# Patient Record
Sex: FEMALE | Race: ASIAN
[De-identification: names, ages, dates, MRNs, and addresses within clinical notes are randomized per-mention and may not be internally consistent; named-entity substitution may affect disease eponyms.]

---

## 2019-09-12 ENCOUNTER — HOSPITAL ENCOUNTER (OUTPATIENT)
Dept: HOSPITAL 4 - SED | Age: 54
Setting detail: OBSERVATION
LOS: 1 days | Discharge: HOME | End: 2019-09-13
Attending: INTERNAL MEDICINE | Admitting: INTERNAL MEDICINE
Payer: COMMERCIAL

## 2019-09-12 VITALS — HEIGHT: 63 IN | WEIGHT: 103 LBS | BODY MASS INDEX: 18.25 KG/M2

## 2019-09-12 VITALS — SYSTOLIC BLOOD PRESSURE: 120 MMHG

## 2019-09-12 VITALS — SYSTOLIC BLOOD PRESSURE: 113 MMHG

## 2019-09-12 VITALS — SYSTOLIC BLOOD PRESSURE: 141 MMHG

## 2019-09-12 VITALS — SYSTOLIC BLOOD PRESSURE: 104 MMHG

## 2019-09-12 VITALS — SYSTOLIC BLOOD PRESSURE: 108 MMHG

## 2019-09-12 DIAGNOSIS — R11.2: ICD-10-CM

## 2019-09-12 DIAGNOSIS — E87.1: ICD-10-CM

## 2019-09-12 DIAGNOSIS — R10.13: Primary | ICD-10-CM

## 2019-09-12 LAB
ALBUMIN SERPL BCP-MCNC: 3.9 G/DL (ref 3.4–4.8)
ALT SERPL W P-5'-P-CCNC: 24 U/L (ref 12–78)
ANION GAP SERPL CALCULATED.3IONS-SCNC: 15 MMOL/L (ref 5–15)
APPEARANCE UR: CLEAR
AST SERPL W P-5'-P-CCNC: 23 U/L (ref 10–37)
BACTERIA URNS QL MICRO: (no result) /HPF
BASOPHILS # BLD AUTO: 0 K/UL (ref 0–0.2)
BASOPHILS NFR BLD AUTO: 0.4 % (ref 0–2)
BILIRUB SERPL-MCNC: 1 MG/DL (ref 0–1)
BILIRUB UR QL STRIP: NEGATIVE
BUN SERPL-MCNC: 4 MG/DL (ref 8–21)
CALCIUM SERPL-MCNC: 8.3 MG/DL (ref 8.4–11)
CHLORIDE SERPL-SCNC: 87 MMOL/L (ref 98–107)
COLOR UR: YELLOW
CREAT SERPL-MCNC: 0.81 MG/DL (ref 0.55–1.3)
EOSINOPHIL # BLD AUTO: 0 K/UL (ref 0–0.4)
EOSINOPHIL NFR BLD AUTO: 0 % (ref 0–4)
ERYTHROCYTE [DISTWIDTH] IN BLOOD BY AUTOMATED COUNT: 12.2 % (ref 9–15)
GLUCOSE SERPL-MCNC: 139 MG/DL (ref 70–99)
GLUCOSE UR STRIP-MCNC: (no result) MG/DL
HCT VFR BLD AUTO: 39.4 % (ref 36–48)
HGB BLD-MCNC: 13.4 G/DL (ref 12–16)
HGB UR QL STRIP: (no result)
KETONES UR STRIP-MCNC: (no result) MG/DL
LEUKOCYTE ESTERASE UR QL STRIP: NEGATIVE
LIPASE SERPL-CCNC: 74 U/L (ref 73–393)
LYMPHOCYTES # BLD AUTO: 0.7 K/UL (ref 1–5.5)
LYMPHOCYTES NFR BLD AUTO: 11.7 % (ref 20.5–51.5)
MCH RBC QN AUTO: 31 PG (ref 27–31)
MCHC RBC AUTO-ENTMCNC: 34 % (ref 32–36)
MCV RBC AUTO: 90 FL (ref 79–98)
MONOCYTES # BLD MANUAL: 0.2 K/UL (ref 0–1)
MONOCYTES # BLD MANUAL: 2.5 % (ref 1.7–9.3)
NEUTROPHILS # BLD AUTO: 5.3 K/UL (ref 1.8–7.7)
NEUTROPHILS NFR BLD AUTO: 85.4 % (ref 40–70)
NITRITE UR QL STRIP: NEGATIVE
PH UR STRIP: 6.5 [PH] (ref 5–8)
PLATELET # BLD AUTO: 195 K/UL (ref 130–430)
POTASSIUM SERPL-SCNC: 3.3 MMOL/L (ref 3.5–5.1)
PROT UR QL STRIP: NEGATIVE
RBC # BLD AUTO: 4.37 MIL/UL (ref 4.2–6.2)
RBC #/AREA URNS HPF: (no result) /HPF (ref 0–3)
SODIUM SERPLBLD-SCNC: 120 MMOL/L (ref 136–145)
SP GR UR STRIP: 1.01 (ref 1–1.03)
UROBILINOGEN UR STRIP-MCNC: 0.2 MG/DL (ref 0.2–1)
WBC # BLD AUTO: 6.2 K/UL (ref 4.8–10.8)
WBC #/AREA URNS HPF: (no result) /HPF (ref 0–3)

## 2019-09-12 PROCEDURE — 83690 ASSAY OF LIPASE: CPT

## 2019-09-12 PROCEDURE — 80048 BASIC METABOLIC PNL TOTAL CA: CPT

## 2019-09-12 PROCEDURE — 96361 HYDRATE IV INFUSION ADD-ON: CPT

## 2019-09-12 PROCEDURE — 99285 EMERGENCY DEPT VISIT HI MDM: CPT

## 2019-09-12 PROCEDURE — 96374 THER/PROPH/DIAG INJ IV PUSH: CPT

## 2019-09-12 PROCEDURE — 36415 COLL VENOUS BLD VENIPUNCTURE: CPT

## 2019-09-12 PROCEDURE — 85025 COMPLETE CBC W/AUTO DIFF WBC: CPT

## 2019-09-12 PROCEDURE — G0378 HOSPITAL OBSERVATION PER HR: HCPCS

## 2019-09-12 PROCEDURE — 80053 COMPREHEN METABOLIC PANEL: CPT

## 2019-09-12 PROCEDURE — 96375 TX/PRO/DX INJ NEW DRUG ADDON: CPT

## 2019-09-12 PROCEDURE — 81000 URINALYSIS NONAUTO W/SCOPE: CPT

## 2019-09-12 PROCEDURE — C9113 INJ PANTOPRAZOLE SODIUM, VIA: HCPCS

## 2019-09-12 RX ADMIN — SODIUM CHLORIDE SCH MLS/HR: 9 INJECTION, SOLUTION INTRAVENOUS at 10:31

## 2019-09-12 RX ADMIN — SODIUM CHLORIDE SCH MLS/HR: 9 INJECTION, SOLUTION INTRAVENOUS at 20:38

## 2019-09-12 NOTE — NUR
Patient will be admitted to care of Dr. Skinner.  Admitted to med surg unit.  
Will go to room 100B.  Belongings list completed.  Summary report printed. 
Report will be given at bedside.

## 2019-09-12 NOTE — NUR
ADMISSION NOTE

Received patient from ER via edwar, received report from La Nena PRESTON. Patient admitted with 
diagnosis of Abdominal pain secondary to Colonoscopy preparation. Patient oriented to 
hospital routine, call light, toileting and safety-patient verbalized understanding.

## 2019-09-12 NOTE — NUR
Pt is AAOx4 and able to verbalize needs. Pt bib BLS transport c/o n/v since 
2030 last night. Per pt she is scheduled to have a colonoscopy today at 
[place]. Pt states that she was taking bowel prep in preparation for 
colonoscopy. After the second dose she started to feel nauseous and could not 
stop vomitting since 2030 last night. Pt states nausea at this time. Denies any 
pain. Will continue to monitor pt.

## 2019-09-12 NOTE — NUR
Medication reconciliation completed with information provided by Patient. No 
home meds per patient.

## 2019-09-12 NOTE — NUR
Sleeping

No signs of distress noted. Breathing even and unlabored. IVF infusing well. Call light with 
the patient. Safety precautions in place.

## 2019-09-12 NOTE — NUR
patient seen and completed admission assessment but patient speaks mandarin but understood 
some english. has iv access on the left ac #20. saline lock. breathing even and unlabored. 
abdomen soft and non distended. ambulatory goes to the bathroom by herself. bed in low 
position, alarmed and locked. call lights within reach. instructed to call for assistance.

## 2019-09-12 NOTE — NUR
Resting

Patient is resting comfortably in bed, talking to family. No signs of distress noted. 
Breathing is even and unlabored. IVF infusing well. Updated patient plan of care. Patient 
verbalized understanding. Call light with the patient. Safety precautions in place. 

-------------------------------------------------------------------------------

Addendum: 09/12/19 at 2154 by Nicole Bob RN

-------------------------------------------------------------------------------

Patient denies nausea and pain at this time.

## 2019-09-12 NOTE — NUR
End of life care decisions discussed with  Patient. Opportunity for questions 
and concerns addressed. Patient's code status is full code paperwork completed 
and placed in chart.

## 2019-09-12 NOTE — NUR
Opening notes

Received report. Patient is resting comfortably in bed. No signs of distress noted. 
Breathing even and unlabored. Patient stating abdominal pain is getting better. IV patent 
and intact, infusing fluids. No needs at this time. Call light with the patient. Safety 
precautions in place.

## 2019-09-12 NOTE — NUR
# 20 gauge angiocath placed to LFA.  Use of asceptic technique.  Opsite placed 
over site.  Blood return noted. Flushed with 10 cc of normal saline.  No 
evidence of infiltration noted.  Patient tolerated well.

## 2019-09-13 VITALS — SYSTOLIC BLOOD PRESSURE: 123 MMHG

## 2019-09-13 VITALS — SYSTOLIC BLOOD PRESSURE: 99 MMHG

## 2019-09-13 LAB
ANION GAP SERPL CALCULATED.3IONS-SCNC: 5 MMOL/L (ref 5–15)
BUN SERPL-MCNC: 8 MG/DL (ref 8–21)
CALCIUM SERPL-MCNC: 8 MG/DL (ref 8.4–11)
CHLORIDE SERPL-SCNC: 108 MMOL/L (ref 98–107)
CREAT SERPL-MCNC: 0.75 MG/DL (ref 0.55–1.3)
GFR SERPL CREATININE-BSD FRML MDRD: 103 ML/MIN (ref 90–?)
GLUCOSE SERPL-MCNC: 88 MG/DL (ref 70–99)
POTASSIUM SERPL-SCNC: 4 MMOL/L (ref 3.5–5.1)
SODIUM SERPLBLD-SCNC: 141 MMOL/L (ref 136–145)

## 2019-09-13 RX ADMIN — SODIUM CHLORIDE SCH MLS/HR: 9 INJECTION, SOLUTION INTRAVENOUS at 04:37

## 2019-09-13 NOTE — NUR
Tolerating Diet:

Mechanical soft diet was served. Patient ate 75% of the tray, tolerating well, no pain , no 
N.V. She states feeling much better and ready to go home.

## 2019-09-13 NOTE — NUR
Sleeping

Patient walks to bathroom with no assist. No needs. Patient went back to sleep. Call light 
with the patient. Safety precautions in place.

## 2019-09-13 NOTE — NUR
Resting

Patient awake in bed, no signs of distress noted. Breathing even and unlabored. Patient 
denies any pain or nausea. New bag of IVF hung. No needs at this time. Call light with the 
patient. Safety precautions in place.

## 2019-09-13 NOTE — NUR
DC:

D/C Patient

Patient given D/C instructions. Patient verbalized understanding. MD discussed with patient 
the results and treatment provided. Ambulatory with steady gait for discharge to home. 
Patient in stable condition, ID band removed. IV catheter removed, intact and dressing 
applied, no active bleeding. Patient educated on pain management. All belongings sent with 
patient.

## 2019-09-13 NOTE — NUR
Resting:

Patient is given DC instruction and IV lock removed, no sign of bleeding. She is resting on 
bed waiting for her  to pick her up.

## 2019-09-13 NOTE — NUR
Closing notes

Patient resting in bed, on cell phone. No signs of distress noted. Breathing even and 
unlabored. Denies any pain or nausea. IV patent and intact, infusing fluids. All needs met 
throughout the shift. Call light with the patient. Safety precautions in place. Will endorse 
care to day shift RN.

## 2019-09-13 NOTE — NUR
MD round /w DC order:

Dr. Skinner makes round and has order to duran diet to Mechanical soft diet and to DC home 
if tolerating diet well, also states to DC  IVF. The patient is aware.

## 2019-09-13 NOTE — NUR
Initial note:

Patient is alert, oriented x4, denies any pain or discomfort. States feeling hungry. She is 
walking around in the room with IV pole. On Normal Saline IVF at 100 ml/hr, infusing well , 
no sign of infiltration.

## 2020-12-16 ENCOUNTER — HOSPITAL ENCOUNTER (INPATIENT)
Dept: HOSPITAL 4 - SED | Age: 55
LOS: 6 days | Discharge: HOME | DRG: 871 | End: 2020-12-22
Attending: FAMILY MEDICINE | Admitting: FAMILY MEDICINE
Payer: COMMERCIAL

## 2020-12-16 VITALS — BODY MASS INDEX: 17.24 KG/M2 | HEIGHT: 64 IN | WEIGHT: 101 LBS

## 2020-12-16 VITALS — SYSTOLIC BLOOD PRESSURE: 142 MMHG

## 2020-12-16 DIAGNOSIS — E83.42: ICD-10-CM

## 2020-12-16 DIAGNOSIS — E43: ICD-10-CM

## 2020-12-16 DIAGNOSIS — E88.09: ICD-10-CM

## 2020-12-16 DIAGNOSIS — U07.1: ICD-10-CM

## 2020-12-16 DIAGNOSIS — I10: ICD-10-CM

## 2020-12-16 DIAGNOSIS — J12.89: ICD-10-CM

## 2020-12-16 DIAGNOSIS — Z79.01: ICD-10-CM

## 2020-12-16 DIAGNOSIS — E83.51: ICD-10-CM

## 2020-12-16 DIAGNOSIS — A41.89: Primary | ICD-10-CM

## 2020-12-16 DIAGNOSIS — R73.9: ICD-10-CM

## 2020-12-16 DIAGNOSIS — E87.6: ICD-10-CM

## 2020-12-16 DIAGNOSIS — E83.52: ICD-10-CM

## 2020-12-16 DIAGNOSIS — J96.00: ICD-10-CM

## 2020-12-16 DIAGNOSIS — E87.1: ICD-10-CM

## 2020-12-16 LAB
ALBUMIN SERPL BCP-MCNC: 3 G/DL (ref 3.4–4.8)
ALT SERPL W P-5'-P-CCNC: 41 U/L (ref 12–78)
ANION GAP SERPL CALCULATED.3IONS-SCNC: 7 MMOL/L (ref 5–15)
AST SERPL W P-5'-P-CCNC: 42 U/L (ref 10–37)
BASOPHILS # BLD AUTO: 0 K/UL (ref 0–0.2)
BASOPHILS NFR BLD AUTO: 0.2 % (ref 0–2)
BILIRUB SERPL-MCNC: 0.3 MG/DL (ref 0–1)
BUN SERPL-MCNC: 12 MG/DL (ref 8–21)
CALCIUM SERPL-MCNC: 8.1 MG/DL (ref 8.4–11)
CHLORIDE SERPL-SCNC: 94 MMOL/L (ref 98–107)
CREAT SERPL-MCNC: 0.76 MG/DL (ref 0.55–1.3)
EOSINOPHIL # BLD AUTO: 0 K/UL (ref 0–0.4)
EOSINOPHIL NFR BLD AUTO: 0.1 % (ref 0–4)
ERYTHROCYTE [DISTWIDTH] IN BLOOD BY AUTOMATED COUNT: 11.9 % (ref 9–15)
GFR SERPL CREATININE-BSD FRML MDRD: 101 ML/MIN (ref 90–?)
GLUCOSE SERPL-MCNC: 131 MG/DL (ref 70–99)
HCT VFR BLD AUTO: 37.4 % (ref 36–48)
HGB BLD-MCNC: 12.8 G/DL (ref 12–16)
INR PPP: 0.9 (ref 0.8–1.2)
LYMPHOCYTES # BLD AUTO: 0.6 K/UL (ref 1–5.5)
LYMPHOCYTES NFR BLD AUTO: 9.1 % (ref 20.5–51.5)
MCH RBC QN AUTO: 30 PG (ref 27–31)
MCHC RBC AUTO-ENTMCNC: 34 % (ref 32–36)
MCV RBC AUTO: 87 FL (ref 79–98)
MONOCYTES # BLD MANUAL: 0.7 K/UL (ref 0–1)
MONOCYTES # BLD MANUAL: 10.1 % (ref 1.7–9.3)
NEUTROPHILS # BLD AUTO: 5.2 K/UL (ref 1.8–7.7)
NEUTROPHILS NFR BLD AUTO: 80.5 % (ref 40–70)
PLATELET # BLD AUTO: 211 K/UL (ref 130–430)
POTASSIUM SERPL-SCNC: 2.9 MMOL/L (ref 3.5–5.1)
PROTHROMBIN TIME: 9.2 SECS (ref 9.5–12.5)
RBC # BLD AUTO: 4.28 MIL/UL (ref 4.2–6.2)
SODIUM SERPLBLD-SCNC: 126 MMOL/L (ref 136–145)
WBC # BLD AUTO: 6.5 K/UL (ref 4.8–10.8)

## 2020-12-16 PROCEDURE — G0481 DRUG TEST DEF 8-14 CLASSES: HCPCS

## 2020-12-16 PROCEDURE — G0480 DRUG TEST DEF 1-7 CLASSES: HCPCS

## 2020-12-16 PROCEDURE — G0378 HOSPITAL OBSERVATION PER HR: HCPCS

## 2020-12-16 RX ADMIN — DEXAMETHASONE SODIUM PHOSPHATE SCH MG: 10 INJECTION INTRAMUSCULAR; INTRAVENOUS at 23:41

## 2020-12-17 VITALS — SYSTOLIC BLOOD PRESSURE: 106 MMHG

## 2020-12-17 VITALS — SYSTOLIC BLOOD PRESSURE: 107 MMHG

## 2020-12-17 VITALS — SYSTOLIC BLOOD PRESSURE: 100 MMHG

## 2020-12-17 VITALS — SYSTOLIC BLOOD PRESSURE: 109 MMHG

## 2020-12-17 LAB
ALBUMIN SERPL BCP-MCNC: 2.8 G/DL (ref 3.4–4.8)
ALT SERPL W P-5'-P-CCNC: 47 U/L (ref 12–78)
ANION GAP SERPL CALCULATED.3IONS-SCNC: 10 MMOL/L (ref 5–15)
AST SERPL W P-5'-P-CCNC: 51 U/L (ref 10–37)
BASOPHILS # BLD AUTO: 0 K/UL (ref 0–0.2)
BASOPHILS NFR BLD AUTO: 0.2 % (ref 0–2)
BILIRUB SERPL-MCNC: 0.2 MG/DL (ref 0–1)
BUN SERPL-MCNC: 10 MG/DL (ref 8–21)
CALCIUM SERPL-MCNC: 7.9 MG/DL (ref 8.4–11)
CHLORIDE SERPL-SCNC: 98 MMOL/L (ref 98–107)
CREAT SERPL-MCNC: 0.75 MG/DL (ref 0.55–1.3)
CRP SERPL-MCNC: 6.7 MG/DL (ref 0–0.5)
EOSINOPHIL # BLD AUTO: 0 K/UL (ref 0–0.4)
EOSINOPHIL NFR BLD AUTO: 0 % (ref 0–4)
ERYTHROCYTE [DISTWIDTH] IN BLOOD BY AUTOMATED COUNT: 12.1 % (ref 9–15)
GFR SERPL CREATININE-BSD FRML MDRD: 102 ML/MIN (ref 90–?)
GLUCOSE SERPL-MCNC: 141 MG/DL (ref 70–99)
HCT VFR BLD AUTO: 39.4 % (ref 36–48)
HGB BLD-MCNC: 13.5 G/DL (ref 12–16)
LYMPHOCYTES # BLD AUTO: 0.8 K/UL (ref 1–5.5)
LYMPHOCYTES NFR BLD AUTO: 18.5 % (ref 20.5–51.5)
MCH RBC QN AUTO: 30 PG (ref 27–31)
MCHC RBC AUTO-ENTMCNC: 34 % (ref 32–36)
MCV RBC AUTO: 88 FL (ref 79–98)
MONOCYTES # BLD MANUAL: 0.3 K/UL (ref 0–1)
MONOCYTES # BLD MANUAL: 7.2 % (ref 1.7–9.3)
NEUTROPHILS # BLD AUTO: 3.1 K/UL (ref 1.8–7.7)
NEUTROPHILS NFR BLD AUTO: 74.1 % (ref 40–70)
PHOSPHATE SERPL-MCNC: 4 MG/DL (ref 2.7–4.5)
PLATELET # BLD AUTO: 249 K/UL (ref 130–430)
POTASSIUM SERPL-SCNC: 3.6 MMOL/L (ref 3.5–5.1)
RBC # BLD AUTO: 4.48 MIL/UL (ref 4.2–6.2)
SODIUM SERPLBLD-SCNC: 134 MMOL/L (ref 136–145)
WBC # BLD AUTO: 4.1 K/UL (ref 4.8–10.8)

## 2020-12-17 PROCEDURE — XW033E5 INTRODUCTION OF REMDESIVIR ANTI-INFECTIVE INTO PERIPHERAL VEIN, PERCUTANEOUS APPROACH, NEW TECHNOLOGY GROUP 5: ICD-10-PCS | Performed by: FAMILY MEDICINE

## 2020-12-17 RX ADMIN — SODIUM CHLORIDE SCH MLS/HR: 0.9 INJECTION, SOLUTION INTRAVENOUS at 20:00

## 2020-12-17 RX ADMIN — Medication SCH ML: at 14:15

## 2020-12-17 RX ADMIN — Medication SCH ML: at 22:51

## 2020-12-17 RX ADMIN — DEXAMETHASONE SODIUM PHOSPHATE SCH MG: 10 INJECTION INTRAMUSCULAR; INTRAVENOUS at 23:15

## 2020-12-17 RX ADMIN — ALBUTEROL SULFATE SCH PUFFS: 90 AEROSOL, METERED RESPIRATORY (INHALATION) at 11:00

## 2020-12-17 RX ADMIN — FAMOTIDINE SCH MG: 20 TABLET, FILM COATED ORAL at 08:25

## 2020-12-17 RX ADMIN — Medication SCH ML: at 08:25

## 2020-12-17 RX ADMIN — ALBUTEROL SULFATE SCH PUFFS: 90 AEROSOL, METERED RESPIRATORY (INHALATION) at 19:00

## 2020-12-17 RX ADMIN — ALBUTEROL SULFATE SCH PUFFS: 90 AEROSOL, METERED RESPIRATORY (INHALATION) at 15:00

## 2020-12-17 RX ADMIN — ALBUTEROL SULFATE SCH PUFFS: 90 AEROSOL, METERED RESPIRATORY (INHALATION) at 14:15

## 2020-12-17 RX ADMIN — ENOXAPARIN SODIUM SCH MG: 40 INJECTION SUBCUTANEOUS at 08:25

## 2020-12-17 RX ADMIN — OXYCODONE HYDROCHLORIDE AND ACETAMINOPHEN SCH MG: 500 TABLET ORAL at 08:25

## 2020-12-17 RX ADMIN — CEFTRIAXONE SODIUM SCH MLS/HR: 1 INJECTION, SOLUTION INTRAVENOUS at 19:00

## 2020-12-17 RX ADMIN — OXYCODONE HYDROCHLORIDE AND ACETAMINOPHEN SCH MG: 500 TABLET ORAL at 20:41

## 2020-12-17 RX ADMIN — ALBUTEROL SULFATE SCH PUFFS: 90 AEROSOL, METERED RESPIRATORY (INHALATION) at 23:00

## 2020-12-17 NOTE — NUR
RN rounds

patient resting in bed, awake, no signs of distress, IV line is patent and infusing well, 
re-educated patient to self-prone as much as possible, she verbalized understanding, 
continuing to monitor, patient is currently on 2L via nasal cannula, continuing to monitor, 
bed in lowest position, two side rails up, call light within reach, fall, aspiration and 
isolation precautions in place.

## 2020-12-17 NOTE — NUR
Closing note

patient resting in bed, re-educated patient to self-prone as much as possible, she 
verbalized understanding, patient is currently on 2L via nasal cannula, all needs met, will 
endorse report to NOC shift nurse, bed in lowest position, two side rails up, call light 
within reach, fall, aspiration and isolation precautions in place.

## 2020-12-17 NOTE — NUR
Consultation Paged





Reason for Consultation: Covid

Was consult called: Y

Person who was notified: Brea

Consulting Physician: Vargas Field

Ordering Physician: Dr. Alcocer. Stephanie

## 2020-12-17 NOTE — NUR
RN rounds

patient resting in bed, awake, no signs of distress, set up patient to eat dinner, IV line 
is patent and infusing well, saline locked IV line, re-educated patient to self-prone as 
much as possible, she verbalized understanding, continuing to monitor, patient is currently 
on 2L via nasal cannula, continuing to monitor, bed in lowest position, two side rails up, 
call light within reach, fall, aspiration and isolation precautions in place.

## 2020-12-17 NOTE — NUR
ROUNDS

PATIENT RESTING COMFORTABLY IN BED, VITALS STABLE, NO PAIN AND DISCOMFORT AT THIS TIME. 
ASSESSMENT DONE AND DOCUEMNTED. SEE FLOWSHEET. NEEDS ATTENDED TO. SAFETY MEASURES IN PLACED. 
CALL LIGHT PLACED WITHIN REACH.

## 2020-12-17 NOTE — NUR
NOTES

RECEIVED PATIENT FROM ER VIA [facility] WITH STABLE VITAL SIGNS , NOT IN DISTRESS, ON 2 L NC. 
DENIES ANY PAIN AT THIS TIME. ADMISSION DATA AND ASSESSMENT DONE AND DOCUMENTED. SEE 
FLOWSHEET. PLAN OF CARE DISCUSSED AND PATIENT VERBALIZED UNDERSTANDING. NEEDS ATTENDED TO. 
CALL LIGHT PLACED WITHIN REACH.

## 2020-12-17 NOTE — NUR
RN rounds

patient resting in bed, Telemetry leads placed back on, re-educated patient to be in prone 
position, patient verbalized understanding, educated patient on IV medication uses and 
potential side effects, she verbalized understanding, IV line is patent and infusing well, 
patient remains on 2L via nasal cannula, oxygen saturation is 98% at this time, continuing 
to monitor, bed in lowest position, two side rails up, call light within reach, fall, 
aspiration and isolation precautions in place.

## 2020-12-17 NOTE — NUR
Patient will be admitted to care of Dr. Alcocer.  Admitted to Tele unit.  Will 
go to room 134.  Belongings list completed.  Complete and up to date summary 
report printed. SBAR report to be given at bedside with opportunity for 
questions.

## 2020-12-17 NOTE — NUR
RN rounds

patient resting in bed, awake, denies pain, on 2L via nasal cannula, tolerating well, 
patient is attempting to self-proning so far she is able to tolerate it, continuing to 
monitor, bed in lowest position, two side rails up, call light within reach, fall, 
aspiration and isolation precautions in place.

## 2020-12-17 NOTE — NUR
Opening note

patient resting in bed, a/ox4, denies pain, on 2L via nasal cannula at this time, tolerating 
well thus far, no shortness of breath, no sign of distress, IV line is patent and infusing 
well, continuing to encourage patient to self-prone, educated patient on plan of care and 
call light system, she verbalized understanding, bed in lowest position, two side rails up, 
call light within reach, fall, aspiration and isolation precautions in place.

## 2020-12-18 VITALS — SYSTOLIC BLOOD PRESSURE: 114 MMHG

## 2020-12-18 VITALS — SYSTOLIC BLOOD PRESSURE: 99 MMHG

## 2020-12-18 VITALS — SYSTOLIC BLOOD PRESSURE: 101 MMHG

## 2020-12-18 LAB
ALBUMIN SERPL BCP-MCNC: 2.8 G/DL (ref 3.4–4.8)
ALT SERPL W P-5'-P-CCNC: 49 U/L (ref 12–78)
ANION GAP SERPL CALCULATED.3IONS-SCNC: 11 MMOL/L (ref 5–15)
AST SERPL W P-5'-P-CCNC: 39 U/L (ref 10–37)
BASOPHILS # BLD AUTO: 0 K/UL (ref 0–0.2)
BASOPHILS NFR BLD AUTO: 0.4 % (ref 0–2)
BILIRUB SERPL-MCNC: 0.2 MG/DL (ref 0–1)
BUN SERPL-MCNC: 15 MG/DL (ref 8–21)
CALCIUM SERPL-MCNC: 8.2 MG/DL (ref 8.4–11)
CHLORIDE SERPL-SCNC: 100 MMOL/L (ref 98–107)
CREAT SERPL-MCNC: 0.58 MG/DL (ref 0.55–1.3)
EOSINOPHIL # BLD AUTO: 0 K/UL (ref 0–0.4)
EOSINOPHIL NFR BLD AUTO: 0 % (ref 0–4)
ERYTHROCYTE [DISTWIDTH] IN BLOOD BY AUTOMATED COUNT: 11.9 % (ref 9–15)
GFR SERPL CREATININE-BSD FRML MDRD: 138 ML/MIN (ref 90–?)
GLUCOSE SERPL-MCNC: 150 MG/DL (ref 70–99)
HCT VFR BLD AUTO: 38.4 % (ref 36–48)
HGB BLD-MCNC: 12.9 G/DL (ref 12–16)
INR PPP: 0.9 (ref 0.8–1.2)
LACTATE SERPL-SCNC: 323 U/L (ref 81–234)
LYMPHOCYTES # BLD AUTO: 0.5 K/UL (ref 1–5.5)
LYMPHOCYTES NFR BLD AUTO: 10.5 % (ref 20.5–51.5)
MCH RBC QN AUTO: 30 PG (ref 27–31)
MCHC RBC AUTO-ENTMCNC: 34 % (ref 32–36)
MCV RBC AUTO: 89 FL (ref 79–98)
MONOCYTES # BLD MANUAL: 0.2 K/UL (ref 0–1)
MONOCYTES # BLD MANUAL: 4.2 % (ref 1.7–9.3)
NEUTROPHILS # BLD AUTO: 4.4 K/UL (ref 1.8–7.7)
NEUTROPHILS NFR BLD AUTO: 84.9 % (ref 40–70)
PHOSPHATE SERPL-MCNC: 3.3 MG/DL (ref 2.7–4.5)
PLATELET # BLD AUTO: 284 K/UL (ref 130–430)
POTASSIUM SERPL-SCNC: 3.7 MMOL/L (ref 3.5–5.1)
PROTHROMBIN TIME: 9.3 SECS (ref 9.5–12.5)
RBC # BLD AUTO: 4.32 MIL/UL (ref 4.2–6.2)
SODIUM SERPLBLD-SCNC: 136 MMOL/L (ref 136–145)
WBC # BLD AUTO: 5.1 K/UL (ref 4.8–10.8)

## 2020-12-18 RX ADMIN — OXYCODONE HYDROCHLORIDE AND ACETAMINOPHEN SCH MG: 500 TABLET ORAL at 20:59

## 2020-12-18 RX ADMIN — CEFTRIAXONE SODIUM SCH MLS/HR: 1 INJECTION, SOLUTION INTRAVENOUS at 19:00

## 2020-12-18 RX ADMIN — SODIUM CHLORIDE SCH MLS/HR: 0.9 INJECTION, SOLUTION INTRAVENOUS at 20:00

## 2020-12-18 RX ADMIN — OXYCODONE HYDROCHLORIDE AND ACETAMINOPHEN SCH MG: 500 TABLET ORAL at 08:00

## 2020-12-18 RX ADMIN — DEXAMETHASONE SODIUM PHOSPHATE SCH MG: 10 INJECTION INTRAMUSCULAR; INTRAVENOUS at 23:15

## 2020-12-18 RX ADMIN — FAMOTIDINE SCH MG: 20 TABLET, FILM COATED ORAL at 08:00

## 2020-12-18 RX ADMIN — Medication SCH ML: at 20:59

## 2020-12-18 RX ADMIN — ALBUTEROL SULFATE SCH PUFFS: 90 AEROSOL, METERED RESPIRATORY (INHALATION) at 11:00

## 2020-12-18 RX ADMIN — ALBUTEROL SULFATE SCH PUFFS: 90 AEROSOL, METERED RESPIRATORY (INHALATION) at 06:49

## 2020-12-18 RX ADMIN — Medication SCH ML: at 06:19

## 2020-12-18 RX ADMIN — ALBUTEROL SULFATE SCH PUFFS: 90 AEROSOL, METERED RESPIRATORY (INHALATION) at 15:00

## 2020-12-18 RX ADMIN — ENOXAPARIN SODIUM SCH MG: 40 INJECTION SUBCUTANEOUS at 08:00

## 2020-12-18 RX ADMIN — Medication SCH UNIT: at 08:00

## 2020-12-18 RX ADMIN — Medication SCH ML: at 14:30

## 2020-12-18 RX ADMIN — ALBUTEROL SULFATE SCH PUFFS: 90 AEROSOL, METERED RESPIRATORY (INHALATION) at 03:00

## 2020-12-18 NOTE — NUR
Opening note

patient resting in bed, a/ox4, denies pain, on 2L via nasal cannula at this time, tolerating 
well thus far, no shortness of breath, no sign of distress, IV line is patent and infusing 
well, placed Telemetry leads back on patient, continuing to encourage patient to self-prone, 
educated patient on plan of care and call light system, she verbalized understanding, bed in 
lowest position, two side rails up, call light within reach, fall, aspiration and isolation 
precautions in place.

## 2020-12-18 NOTE — NUR
ROUNDS/MEDPASS

PATIENT IN BED, RESTING, SCHEDULED MEDICATIONS ADMINISTERED AT THIS TIME. ALL NEEDS MET. 
CALL LIGHT WITH PATIENT. WILL CONTINUE TO MONITOR.

## 2020-12-18 NOTE — NUR
Dietitian Recommendations



* Recommend regular diet w/ Ensure Enlive TID

(ONS provides 1050 kcal/day, 60 gm protein/day)

SILVERIO AVINA



Please refer to Nutrition Assessment for details.

-------------------------------------------------------------------------------

Addendum: 12/18/20 at 1645 by Michelle Fischer RD

-------------------------------------------------------------------------------

Amended: Links added.

## 2020-12-18 NOTE — NUR
OPENING NOTE

REPORT RECEIVED FROM DAYSHIFT NURSE. PATIENT RECEIVED LYING IN BED, NO S/S OF ACUTE DISTRESS 
NOTED. BREATHING EVEN AND UNLABORED, ON 2L OF OXYGEN, NASAL CANULA ATTACHED PROPERLY, IVF 
INFUSING WELL, IV SITE PATENT, NO SIGNS OF INFILTRATION OR INFECTION NOTED. PATIENT STATED 
THAT SHE HAS ONLY 1 BOWEL MOVEMENT TODAY. DENIES PAIN, SOB, OR FEELING DIZZY. CALL LIGHT 
WITH PATIENT, INSTRUCTED TO CALL FOR ASSISTANCE, PATIENT VERBALIZED UNDERSTANDING AND 
DEMONSTRATED BACK PROPER USE OF CALL LIGHT. BED IS LOCKED AND AT LOWEST POSITION. WILL 
CONTINUE TO MONITOR.

## 2020-12-18 NOTE — NUR
PRONE/ROUNDS

PATIENT SLEEPING, IN PRONE AT THIS TIME. NO S/S OF ACUTE DISTRESS. BREATHING EVEN AND 
UNLABORED. CALL LIGHT WITH PATIENT. WILL CONTINUE TO MONITOR.

## 2020-12-18 NOTE — NUR
****P.T. NOTES****

P.T. EVAL COMPLETED; REFER TO EVAL FOR DETAILS; ENDORSED TO NURSING; O2 SAT ROOM AIR=91%, 
2L=94%

## 2020-12-18 NOTE — NUR
Closing note

patient resting in bed, awake, patient is currently on 2L via nasal cannula, all needs met, 
will endorse report to NOC shift nurse, bed in lowest position, two side rails up, call 
light within reach, fall, aspiration and isolation precautions in place.

## 2020-12-19 VITALS — SYSTOLIC BLOOD PRESSURE: 118 MMHG

## 2020-12-19 VITALS — SYSTOLIC BLOOD PRESSURE: 108 MMHG

## 2020-12-19 VITALS — SYSTOLIC BLOOD PRESSURE: 117 MMHG

## 2020-12-19 LAB
ALBUMIN SERPL BCP-MCNC: 3.2 G/DL (ref 3.4–4.8)
ALT SERPL W P-5'-P-CCNC: 168 U/L (ref 12–78)
ANION GAP SERPL CALCULATED.3IONS-SCNC: 10 MMOL/L (ref 5–15)
AST SERPL W P-5'-P-CCNC: 146 U/L (ref 10–37)
BASOPHILS # BLD AUTO: 0 K/UL (ref 0–0.2)
BASOPHILS NFR BLD AUTO: 0.1 % (ref 0–2)
BILIRUB SERPL-MCNC: 0.3 MG/DL (ref 0–1)
BUN SERPL-MCNC: 13 MG/DL (ref 8–21)
CALCIUM SERPL-MCNC: 8.4 MG/DL (ref 8.4–11)
CHLORIDE SERPL-SCNC: 98 MMOL/L (ref 98–107)
CREAT SERPL-MCNC: 0.54 MG/DL (ref 0.55–1.3)
CRP SERPL-MCNC: 2.1 MG/DL (ref 0–0.5)
CRP SERPL-MCNC: 4.5 MG/DL (ref 0–0.5)
EOSINOPHIL # BLD AUTO: 0 K/UL (ref 0–0.4)
EOSINOPHIL NFR BLD AUTO: 0 % (ref 0–4)
ERYTHROCYTE [DISTWIDTH] IN BLOOD BY AUTOMATED COUNT: 12.1 % (ref 9–15)
ERYTHROCYTE [SEDIMENTATION RATE] IN BLOOD BY WESTERGREN METHOD: 36 MM/HR (ref 0–20)
ERYTHROCYTE [SEDIMENTATION RATE] IN BLOOD BY WESTERGREN METHOD: 36 MM/HR (ref 0–20)
FIBRINOGEN PPP-MCNC: 553 MG/DL (ref 200–400)
GFR SERPL CREATININE-BSD FRML MDRD: 150 ML/MIN (ref 90–?)
GLUCOSE SERPL-MCNC: 126 MG/DL (ref 70–99)
HCT VFR BLD AUTO: 42.1 % (ref 36–48)
HGB BLD-MCNC: 14.4 G/DL (ref 12–16)
LYMPHOCYTES # BLD AUTO: 0.8 K/UL (ref 1–5.5)
LYMPHOCYTES NFR BLD AUTO: 9.7 % (ref 20.5–51.5)
MCH RBC QN AUTO: 30 PG (ref 27–31)
MCHC RBC AUTO-ENTMCNC: 34 % (ref 32–36)
MCV RBC AUTO: 89 FL (ref 79–98)
MONOCYTES # BLD MANUAL: 0.3 K/UL (ref 0–1)
MONOCYTES # BLD MANUAL: 3.6 % (ref 1.7–9.3)
NEUTROPHILS # BLD AUTO: 7.3 K/UL (ref 1.8–7.7)
NEUTROPHILS NFR BLD AUTO: 86.6 % (ref 40–70)
PLATELET # BLD AUTO: 399 K/UL (ref 130–430)
POTASSIUM SERPL-SCNC: 3.5 MMOL/L (ref 3.5–5.1)
RBC # BLD AUTO: 4.75 MIL/UL (ref 4.2–6.2)
SODIUM SERPLBLD-SCNC: 135 MMOL/L (ref 136–145)
WBC # BLD AUTO: 8.4 K/UL (ref 4.8–10.8)

## 2020-12-19 RX ADMIN — CEFTRIAXONE SODIUM SCH MLS/HR: 1 INJECTION, SOLUTION INTRAVENOUS at 18:54

## 2020-12-19 RX ADMIN — ENOXAPARIN SODIUM SCH MG: 40 INJECTION SUBCUTANEOUS at 09:43

## 2020-12-19 RX ADMIN — Medication SCH ML: at 15:22

## 2020-12-19 RX ADMIN — OXYCODONE HYDROCHLORIDE AND ACETAMINOPHEN SCH MG: 500 TABLET ORAL at 21:07

## 2020-12-19 RX ADMIN — FAMOTIDINE SCH MG: 20 TABLET, FILM COATED ORAL at 08:59

## 2020-12-19 RX ADMIN — Medication SCH ML: at 21:08

## 2020-12-19 RX ADMIN — Medication SCH UNIT: at 08:59

## 2020-12-19 RX ADMIN — SODIUM CHLORIDE SCH MLS/HR: 0.9 INJECTION, SOLUTION INTRAVENOUS at 21:07

## 2020-12-19 RX ADMIN — OXYCODONE HYDROCHLORIDE AND ACETAMINOPHEN SCH MG: 500 TABLET ORAL at 08:59

## 2020-12-19 RX ADMIN — DEXAMETHASONE SODIUM PHOSPHATE SCH MG: 10 INJECTION INTRAMUSCULAR; INTRAVENOUS at 23:15

## 2020-12-19 RX ADMIN — Medication SCH ML: at 05:32

## 2020-12-19 NOTE — NUR
OPENING NOTE

REPORT RECEIVED FROM DAYSHIFT NURSE. PATIENT RECEIVED LYING IN BED, AWAKE, RESTING, NO S/S 
OF ACUTE DISTRESS NOTED. BREATHING EVEN AND UNLABORED. NASAL CANULA ATTACHED PROPERLY, ON 2L 
OF OXYGEN. IV ANTIBIOTIC INFUSING WELL, IV SITE IS PATENT, NO SIGNS OF INFILTRATION OR 
INFECTION NOTED. CALL LIGHT WITH PATIENT. BED IS LOCKED AND AT LOWEST POSITION. WILL 
CONTINUE TO MONITOR.

## 2020-12-19 NOTE — NUR
RN Rounds

patient sitting up in bed eating dinner, tolerating well, patient denies any pain or 
shortness of breath.

## 2020-12-19 NOTE — NUR
ROUNDS

PATIENT SLEEPING, IN PRONE. NO CHANGE FROM PREVIOUS CONDITION. ALL NEEDS MET. CALL LIGHT 
WITH PATIENT. WILL CONTINUE TO MONITOR.

## 2020-12-19 NOTE — NUR
Called Pharmacy

called pharmacy and informed them that 1400 dose of remdesivir is not available in 
medication bin, per pharmacy they will bring it to the unit.

## 2020-12-19 NOTE — NUR
ROUNDS

PATIENT IN BED, ASLEEP, NO SIGNS OF DISCOMFORT. CHEST RISE AND FALL EVEN BILATERALLY. CALL 
LIGHT WITH PATIENT. WILL CONTINUE TO MONITOR.

## 2020-12-19 NOTE — NUR
Opening Note

received SBAR report from night shift RN, patient resting in bed, respirations even and 
unlabored on 2L nasal cannula, educated patient on use of call light and asked to call for 
assistance, patient verbalized understanding, call light in reach, educated patient on use 
of bed alarm for patient safety, patient verbalized understanding, patient refusing bed 
alarm, bed in low and locked position.

## 2020-12-19 NOTE — NUR
RN Rounds

patient resting in bed, respirations even and unlabored on 2L nasal cannula, no acute 
distress noted, patient denies any pain.

## 2020-12-19 NOTE — NUR
Closing Note

SBAR report given to receiving RN, patient resting in bed, respirations even and unlabored 
on 2L nasal cannula, no acute distress noted, educated patient on use of call light and 
asked to call for assistance, patient verbalized understanding, call light in reach, 
educated patient on use of bed alarm for patient safety, patient verbalized understanding, 
patient refusing bed alarm, bed in low and locked position, care endorsed to night shift RN.

## 2020-12-20 VITALS — SYSTOLIC BLOOD PRESSURE: 101 MMHG

## 2020-12-20 VITALS — SYSTOLIC BLOOD PRESSURE: 116 MMHG

## 2020-12-20 VITALS — SYSTOLIC BLOOD PRESSURE: 109 MMHG

## 2020-12-20 VITALS — SYSTOLIC BLOOD PRESSURE: 114 MMHG

## 2020-12-20 LAB
ALBUMIN SERPL BCP-MCNC: 3 G/DL (ref 3.4–4.8)
ALT SERPL W P-5'-P-CCNC: 151 U/L (ref 12–78)
ANION GAP SERPL CALCULATED.3IONS-SCNC: 11 MMOL/L (ref 5–15)
APAP SERPL-MCNC: < 1 UG/ML (ref 1–30)
AST SERPL W P-5'-P-CCNC: 62 U/L (ref 10–37)
BASOPHILS # BLD AUTO: 0 K/UL (ref 0–0.2)
BASOPHILS NFR BLD AUTO: 0.1 % (ref 0–2)
BILIRUB SERPL-MCNC: 0.3 MG/DL (ref 0–1)
BUN SERPL-MCNC: 18 MG/DL (ref 8–21)
CALCIUM SERPL-MCNC: 8.3 MG/DL (ref 8.4–11)
CHLORIDE SERPL-SCNC: 98 MMOL/L (ref 98–107)
CREAT SERPL-MCNC: 0.6 MG/DL (ref 0.55–1.3)
CRP SERPL-MCNC: 1.4 MG/DL (ref 0–0.5)
EOSINOPHIL # BLD AUTO: 0 K/UL (ref 0–0.4)
EOSINOPHIL NFR BLD AUTO: 0 % (ref 0–4)
ERYTHROCYTE [DISTWIDTH] IN BLOOD BY AUTOMATED COUNT: 12.2 % (ref 9–15)
ERYTHROCYTE [SEDIMENTATION RATE] IN BLOOD BY WESTERGREN METHOD: 21 MM/HR (ref 0–20)
GFR SERPL CREATININE-BSD FRML MDRD: 133 ML/MIN (ref 90–?)
GLUCOSE SERPL-MCNC: 128 MG/DL (ref 70–99)
HCT VFR BLD AUTO: 40.8 % (ref 36–48)
HGB BLD-MCNC: 14.2 G/DL (ref 12–16)
LYMPHOCYTES # BLD AUTO: 0.6 K/UL (ref 1–5.5)
LYMPHOCYTES NFR BLD AUTO: 9.5 % (ref 20.5–51.5)
MCH RBC QN AUTO: 31 PG (ref 27–31)
MCHC RBC AUTO-ENTMCNC: 35 % (ref 32–36)
MCV RBC AUTO: 88 FL (ref 79–98)
MONOCYTES # BLD MANUAL: 0.3 K/UL (ref 0–1)
MONOCYTES # BLD MANUAL: 5 % (ref 1.7–9.3)
NEUTROPHILS # BLD AUTO: 5.7 K/UL (ref 1.8–7.7)
NEUTROPHILS NFR BLD AUTO: 85.4 % (ref 40–70)
PLATELET # BLD AUTO: 442 K/UL (ref 130–430)
POTASSIUM SERPL-SCNC: 3.8 MMOL/L (ref 3.5–5.1)
RBC # BLD AUTO: 4.63 MIL/UL (ref 4.2–6.2)
SODIUM SERPLBLD-SCNC: 134 MMOL/L (ref 136–145)
WBC # BLD AUTO: 6.7 K/UL (ref 4.8–10.8)

## 2020-12-20 RX ADMIN — OXYCODONE HYDROCHLORIDE AND ACETAMINOPHEN SCH MG: 500 TABLET ORAL at 20:56

## 2020-12-20 RX ADMIN — CEFTRIAXONE SODIUM SCH MLS/HR: 1 INJECTION, SOLUTION INTRAVENOUS at 18:26

## 2020-12-20 RX ADMIN — Medication SCH ML: at 06:00

## 2020-12-20 RX ADMIN — Medication SCH ML: at 22:46

## 2020-12-20 RX ADMIN — FAMOTIDINE SCH MG: 20 TABLET, FILM COATED ORAL at 08:45

## 2020-12-20 RX ADMIN — Medication SCH UNIT: at 08:45

## 2020-12-20 RX ADMIN — Medication SCH ML: at 14:16

## 2020-12-20 RX ADMIN — ENOXAPARIN SODIUM SCH MG: 40 INJECTION SUBCUTANEOUS at 09:00

## 2020-12-20 RX ADMIN — OXYCODONE HYDROCHLORIDE AND ACETAMINOPHEN SCH MG: 500 TABLET ORAL at 08:45

## 2020-12-20 RX ADMIN — SODIUM CHLORIDE SCH MLS/HR: 0.9 INJECTION, SOLUTION INTRAVENOUS at 20:55

## 2020-12-20 NOTE — NUR
Oxygen

patients O2 Sat 98% on 1L nasal cannula, patient placed on room air, O2Sat 98% after 5 
minutes, patient tolerating room air well, patient denies any shortness of breath or chest 
pain, no acute distress noted, RT at bedside.

## 2020-12-20 NOTE — NUR
CONSULTATION PAGED/CALLED

Reason for Consultation: TRANSAMINITIS

Person Who was Notified: TRIP

Consulting Physician: THOMAS GAYTAN TROUNG IS ON CALL

Consultant Specialty: GI

Ordering Physician: BRYANT CARRASCO

## 2020-12-20 NOTE — NUR
Closing Note

SBAR report given to receiving RN, patient resting in bed, respirations even and unlabored 
on room air, no acute distress noted, educated patient on use of call light and asked to 
call for assistance, patient verbalized understanding, call light in reach, educated patient 
on use of bed alarm for patient safety, patient verbalized understanding, patient refusing 
bed alarm, bed in low and locked position, care endorsed to night shift RN.

## 2020-12-20 NOTE — NUR
Opening Note

received SBAR report from night shift RN, patient resting in bed, respirations even and 
unlabored on 2L nasal cannula, no acute distress noted, educated patient on use of call 
light and asked to call for assistance, patient verbalized understanding, call light in 
reach, educated patient on use of bed alarm for patient safety, patient verbalized 
understanding, patient refusing bed alarm, bed in low and locked position

## 2020-12-20 NOTE — NUR
Abdominal US

received call from [employer], informed her that patient ate lunch and last oral intake was 
around 1300, per [employer] patient should be NPO for dinner so that abdominal US can be 
completed this evening, educated patient on purpose and procedure for NPO status awaiting 
abd US, patient verbalized understanding, patient NPO awaiting abd US.

## 2020-12-20 NOTE — NUR
Oxygen

patients O2Sat 98% on 2L nasal cannula, educated patient on weaning off of oxygen per Dr. Alcocer, patient verbalized understanding, decreased oxygen to 1L nasal cannula, patients O2 
Sat 98% after 5 minutes, patient tolerating well, no acute distress noted, patient denies 
any shortness of breath or chest pain.

## 2020-12-20 NOTE — NUR
CHANGE OF SHIFT:

endorsed pt. by day shift, no resp. distress. on contact isolation for Covid. call light 
within reach.

## 2020-12-20 NOTE — NUR
NOTES:

pt. pretty awake, resting. no complaints manifested. off telemetry. VS checked, on room air. 
O2 sat 96%. IV lock on left forearm, due IV antibiotic in progress. moves all extremities. 
observed isolation for Covid.  call light within reach.

## 2020-12-20 NOTE — NUR
CLOSING NOTE 

PATIENT IN BED, SLEEPING. NO S/S OF ACUTE DISTRESS NOTED. BREATHING EVEN AND UNLABORED. 
NASAL CANULA ATTACHED PROPERLY, ON 2L OF OXYGEN.  IV SITE PATENT, NO SIGNS OF INFILTRATION 
OR INFECTION NOTED. ALL NEEDS MET THROUGHOUT SHIFT. FALL, SAFETY, AND ISOLATION PRECAUTIONS 
MAINTAINED THROUGHOUT SHIFT. WILL CONTINUE TO MONITOR UNTIL PATIENT CARE IS ENDORSED TO 
ONCOMING DAYSHIFT NURSE.

## 2020-12-21 VITALS — SYSTOLIC BLOOD PRESSURE: 116 MMHG

## 2020-12-21 VITALS — SYSTOLIC BLOOD PRESSURE: 112 MMHG

## 2020-12-21 VITALS — SYSTOLIC BLOOD PRESSURE: 130 MMHG

## 2020-12-21 VITALS — SYSTOLIC BLOOD PRESSURE: 111 MMHG

## 2020-12-21 VITALS — SYSTOLIC BLOOD PRESSURE: 110 MMHG

## 2020-12-21 LAB
ALBUMIN SERPL BCP-MCNC: 3.3 G/DL (ref 3.4–4.8)
ALT SERPL W P-5'-P-CCNC: 154 U/L (ref 12–78)
ANION GAP SERPL CALCULATED.3IONS-SCNC: 13 MMOL/L (ref 5–15)
AST SERPL W P-5'-P-CCNC: 75 U/L (ref 10–37)
BASOPHILS # BLD AUTO: 0 K/UL (ref 0–0.2)
BASOPHILS NFR BLD AUTO: 0.3 % (ref 0–2)
BILIRUB DIRECT SERPL-MCNC: 0.1 MG/DL (ref 0–0.3)
BILIRUB SERPL-MCNC: 0.4 MG/DL (ref 0–1)
BUN SERPL-MCNC: 20 MG/DL (ref 8–21)
CALCIUM SERPL-MCNC: 8.6 MG/DL (ref 8.4–11)
CHLORIDE SERPL-SCNC: 97 MMOL/L (ref 98–107)
CREAT SERPL-MCNC: 0.74 MG/DL (ref 0.55–1.3)
CRP SERPL-MCNC: 0.8 MG/DL (ref 0–0.5)
EOSINOPHIL # BLD AUTO: 0 K/UL (ref 0–0.4)
EOSINOPHIL NFR BLD AUTO: 0 % (ref 0–4)
ERYTHROCYTE [DISTWIDTH] IN BLOOD BY AUTOMATED COUNT: 12.3 % (ref 9–15)
ERYTHROCYTE [SEDIMENTATION RATE] IN BLOOD BY WESTERGREN METHOD: 16 MM/HR (ref 0–20)
GFR SERPL CREATININE-BSD FRML MDRD: 104 ML/MIN (ref 90–?)
GLUCOSE SERPL-MCNC: 125 MG/DL (ref 70–99)
HCT VFR BLD AUTO: 44.3 % (ref 36–48)
HGB BLD-MCNC: 14.9 G/DL (ref 12–16)
INR PPP: 1 (ref 0.8–1.2)
LYMPHOCYTES # BLD AUTO: 1.3 K/UL (ref 1–5.5)
LYMPHOCYTES NFR BLD AUTO: 11 % (ref 20.5–51.5)
MCH RBC QN AUTO: 30 PG (ref 27–31)
MCHC RBC AUTO-ENTMCNC: 34 % (ref 32–36)
MCV RBC AUTO: 89 FL (ref 79–98)
MONOCYTES # BLD MANUAL: 0.4 K/UL (ref 0–1)
MONOCYTES # BLD MANUAL: 3.6 % (ref 1.7–9.3)
NEUTROPHILS # BLD AUTO: 9.7 K/UL (ref 1.8–7.7)
NEUTROPHILS NFR BLD AUTO: 85.1 % (ref 40–70)
PLATELET # BLD AUTO: 526 K/UL (ref 130–430)
POTASSIUM SERPL-SCNC: 3.6 MMOL/L (ref 3.5–5.1)
PROTHROMBIN TIME: 10.2 SECS (ref 9.5–12.5)
RBC # BLD AUTO: 5 MIL/UL (ref 4.2–6.2)
SODIUM SERPLBLD-SCNC: 136 MMOL/L (ref 136–145)
WBC # BLD AUTO: 11.4 K/UL (ref 4.8–10.8)

## 2020-12-21 RX ADMIN — DEXAMETHASONE SODIUM PHOSPHATE SCH MG: 10 INJECTION INTRAMUSCULAR; INTRAVENOUS at 00:02

## 2020-12-21 RX ADMIN — OXYCODONE HYDROCHLORIDE AND ACETAMINOPHEN SCH MG: 500 TABLET ORAL at 10:06

## 2020-12-21 RX ADMIN — SODIUM CHLORIDE SCH MLS/HR: 0.9 INJECTION, SOLUTION INTRAVENOUS at 20:11

## 2020-12-21 RX ADMIN — ENOXAPARIN SODIUM SCH MG: 40 INJECTION SUBCUTANEOUS at 10:07

## 2020-12-21 RX ADMIN — Medication SCH ML: at 22:38

## 2020-12-21 RX ADMIN — Medication SCH UNIT: at 10:18

## 2020-12-21 RX ADMIN — Medication SCH ML: at 06:40

## 2020-12-21 RX ADMIN — OXYCODONE HYDROCHLORIDE AND ACETAMINOPHEN SCH MG: 500 TABLET ORAL at 20:12

## 2020-12-21 RX ADMIN — Medication SCH ML: at 13:53

## 2020-12-21 RX ADMIN — CEFTRIAXONE SODIUM SCH MLS/HR: 1 INJECTION, SOLUTION INTRAVENOUS at 18:14

## 2020-12-21 RX ADMIN — FAMOTIDINE SCH MG: 20 TABLET, FILM COATED ORAL at 10:06

## 2020-12-21 RX ADMIN — DEXAMETHASONE SODIUM PHOSPHATE SCH MG: 10 INJECTION INTRAMUSCULAR; INTRAVENOUS at 22:49

## 2020-12-21 NOTE — NUR
CLOSING NOTES;

pt. awake, been dozing on and off. denies short of breath. IV site lock and keep patent. pt. 
needs attended. for further care and assist. continue to isolate for Covid. call light 
within reach. will endorse to incoming shift.

## 2020-12-21 NOTE — NUR
END OF SHIFT:

PATIENT HAVING DINNER DURING ROUNDS. NOT IN ANY RESPIRATORY DISTRESS. RN WILL HANG IV 
ANTIBIOTICS AFTER DINNER PER REQUEST.

## 2020-12-21 NOTE — NUR
NOTES:

pt. awake, no complaints manifested. breathing better, on room air. VS checked. IV site on 
left arm, due IV antibiotic started. moves all extremities. observed contact isolation for 
Covid. pt. able to ambulate to the restroom. call light bat bedside.

## 2020-12-21 NOTE — NUR
NOTES:

IV antibiotic completed, IV lock and flushed. repositioned self and turn to sides by 
herself. call light at bedside.

## 2020-12-21 NOTE — NUR
NOTES:

offered am care but said she will do it by herself. no shortness of breath. IV site patent. 
went back to sleep. call light at bedside.

## 2020-12-21 NOTE — NUR
AM ROUNDS:

PATIENT ON THE BED,AWAKE,ALERT AND ORIENTED X4. ROOM AIR. NO SOB. IV SALINE MOISES INTACT. 
CALL LIGHT WITH IN REACH. BED LOCKED AT LOWEST POSITION. CONTINUE TO MONITOR.

## 2020-12-22 VITALS — SYSTOLIC BLOOD PRESSURE: 126 MMHG

## 2020-12-22 VITALS — SYSTOLIC BLOOD PRESSURE: 113 MMHG

## 2020-12-22 LAB
ALBUMIN SERPL BCP-MCNC: 2.7 G/DL (ref 3.4–4.8)
ALT SERPL W P-5'-P-CCNC: 111 U/L (ref 12–78)
ANION GAP SERPL CALCULATED.3IONS-SCNC: 11 MMOL/L (ref 5–15)
AST SERPL W P-5'-P-CCNC: 45 U/L (ref 10–37)
BASOPHILS # BLD AUTO: 0 K/UL (ref 0–0.2)
BASOPHILS NFR BLD AUTO: 0.1 % (ref 0–2)
BILIRUB DIRECT SERPL-MCNC: 0.1 MG/DL (ref 0–0.3)
BILIRUB SERPL-MCNC: 0.1 MG/DL (ref 0–1)
BUN SERPL-MCNC: 23 MG/DL (ref 8–21)
CALCIUM SERPL-MCNC: 7.9 MG/DL (ref 8.4–11)
CHLORIDE SERPL-SCNC: 99 MMOL/L (ref 98–107)
CREAT SERPL-MCNC: 0.67 MG/DL (ref 0.55–1.3)
CRP SERPL-MCNC: 0.5 MG/DL (ref 0–0.5)
EOSINOPHIL # BLD AUTO: 0 K/UL (ref 0–0.4)
EOSINOPHIL NFR BLD AUTO: 0 % (ref 0–4)
ERYTHROCYTE [DISTWIDTH] IN BLOOD BY AUTOMATED COUNT: 11.8 % (ref 9–15)
ERYTHROCYTE [SEDIMENTATION RATE] IN BLOOD BY WESTERGREN METHOD: 10 MM/HR (ref 0–20)
GFR SERPL CREATININE-BSD FRML MDRD: 117 ML/MIN (ref 90–?)
GLUCOSE SERPL-MCNC: 151 MG/DL (ref 70–99)
HCT VFR BLD AUTO: 37.1 % (ref 36–48)
HGB BLD-MCNC: 12.8 G/DL (ref 12–16)
INR PPP: 1 (ref 0.8–1.2)
LYMPHOCYTES # BLD AUTO: 0.7 K/UL (ref 1–5.5)
LYMPHOCYTES NFR BLD AUTO: 9.8 % (ref 20.5–51.5)
MCH RBC QN AUTO: 30 PG (ref 27–31)
MCHC RBC AUTO-ENTMCNC: 35 % (ref 32–36)
MCV RBC AUTO: 88 FL (ref 79–98)
MONOCYTES # BLD MANUAL: 0.3 K/UL (ref 0–1)
MONOCYTES # BLD MANUAL: 3.9 % (ref 1.7–9.3)
NEUTROPHILS # BLD AUTO: 6.3 K/UL (ref 1.8–7.7)
NEUTROPHILS NFR BLD AUTO: 86.2 % (ref 40–70)
PLATELET # BLD AUTO: 445 K/UL (ref 130–430)
POTASSIUM SERPL-SCNC: 3.8 MMOL/L (ref 3.5–5.1)
PROTHROMBIN TIME: 10.3 SECS (ref 9.5–12.5)
RBC # BLD AUTO: 4.21 MIL/UL (ref 4.2–6.2)
SODIUM SERPLBLD-SCNC: 135 MMOL/L (ref 136–145)
WBC # BLD AUTO: 7.3 K/UL (ref 4.8–10.8)

## 2020-12-22 RX ADMIN — Medication SCH ML: at 13:53

## 2020-12-22 RX ADMIN — OXYCODONE HYDROCHLORIDE AND ACETAMINOPHEN SCH MG: 500 TABLET ORAL at 08:46

## 2020-12-22 RX ADMIN — Medication SCH UNIT: at 08:46

## 2020-12-22 RX ADMIN — ALBUTEROL SULFATE SCH PUFFS: 90 AEROSOL, METERED RESPIRATORY (INHALATION) at 15:00

## 2020-12-22 RX ADMIN — FAMOTIDINE SCH MG: 20 TABLET, FILM COATED ORAL at 08:46

## 2020-12-22 RX ADMIN — ENOXAPARIN SODIUM SCH MG: 40 INJECTION SUBCUTANEOUS at 08:47

## 2020-12-22 NOTE — NUR
OPENING NOTES

RECEIVED REPORT FROM DAY SHIFT RN. PT RESTING IN BED, ALERT & ORIENTED X4. BREATHING EVEN 
AND UNLABORED TO ROOM AIR. PT ANGELA ANY PAIN AT THIS TIME. NO S/S OF ACUTE DISTRESS NOTED. 
CALL LIGHT WITHIN REACH. SIDE RAILS UP X3. BED LOCKED IN LOWEST POSITION. SAFETY AND 
ISOLATION PRECAUTIONS MAINTAINED. WILL CONTINUE TO MONITOR.

## 2020-12-22 NOTE — NUR
AM ROUNDS:

PATIENT AWAKE DURING ROUNDS. RECEIVED REPORT FROM NIGHT NURSE KAVON.PATIENT RESTING. NO 
COMPLAINED MADE. CONTACT ISOLATION FOR COVID 19 PRECAUTION RENDERED.

## 2020-12-22 NOTE — NUR
D/C Patient

Patient given medication reconciliation form and D/C instructions. Exit Care provided. 
Patient verbalized understanding. Ambulatory with steady gait for discharge to home. Patient 
in stable condition, ID band removed. IV catheter removed, intact and dressing applied, no 
active bleeding. Rx of  given. All belongings sent with patient.

## 2020-12-22 NOTE — NUR
END OF SHIFT:

ENDORSED TO NIGHT NURSE TUNG,WITH ORDERS, DC HOME TONIGHT WITH PRESCRIPTIONS. NO ACUTE 
DISTRESS.